# Patient Record
Sex: FEMALE | Race: WHITE | URBAN - METROPOLITAN AREA
[De-identification: names, ages, dates, MRNs, and addresses within clinical notes are randomized per-mention and may not be internally consistent; named-entity substitution may affect disease eponyms.]

---

## 2018-09-25 LAB
ABO, EXTERNAL RESULT: NORMAL
HEP B, EXTERNAL RESULT: NONREACTIVE
HIV, EXTERNAL RESULT: NEGATIVE
RHOGAM, EXTERNAL RESULT: POSITIVE
RPR, EXTERNAL RESULT: NEGATIVE
RUBELLA TITER, EXTERNAL RESULT: NORMAL

## 2018-10-02 LAB
C. TRACHOMATIS, EXTERNAL RESULT: NEGATIVE
N. GONORRHOEAE, EXTERNAL RESULT: NEGATIVE

## 2019-04-04 LAB — GBS, EXTERNAL RESULT: NEGATIVE

## 2019-05-12 ENCOUNTER — HOSPITAL ENCOUNTER (OUTPATIENT)
Age: 29
Discharge: HOME | End: 2019-05-12
Attending: OBSTETRICS & GYNECOLOGY | Admitting: OBSTETRICS & GYNECOLOGY
Payer: COMMERCIAL

## 2019-05-12 VITALS
BODY MASS INDEX: 27.6 KG/M2 | TEMPERATURE: 98.3 F | WEIGHT: 150 LBS | HEART RATE: 81 BPM | DIASTOLIC BLOOD PRESSURE: 73 MMHG | HEIGHT: 62 IN | RESPIRATION RATE: 16 BRPM | SYSTOLIC BLOOD PRESSURE: 121 MMHG

## 2019-05-12 PROBLEM — O34.211 MATERNAL CARE DUE TO LOW TRANSVERSE UTERINE SCAR FROM PREVIOUS CESAREAN DELIVERY: Status: ACTIVE | Noted: 2019-05-12

## 2019-05-12 PROBLEM — O09.93: Status: ACTIVE | Noted: 2019-05-12

## 2019-05-12 PROBLEM — Z3A.41 POST TERM PREGNANCY AT 41 WEEKS GESTATION: Status: ACTIVE | Noted: 2019-05-12

## 2019-05-12 PROBLEM — O48.0 POST TERM PREGNANCY AT 41 WEEKS GESTATION: Status: ACTIVE | Noted: 2019-05-12

## 2019-05-12 PROCEDURE — 59025 FETAL NON-STRESS TEST: CPT

## 2019-05-12 SDOH — HEALTH STABILITY: MENTAL HEALTH: HOW OFTEN DO YOU HAVE A DRINK CONTAINING ALCOHOL?: NEVER

## 2019-05-12 NOTE — DISCHARGE INSTR - COC
Continuity of Care Form    Patient Name: Moshe Holter   :  1990  MRN:  [id number]    Admit date:  2019  Discharge date:  ***    Code Status Order: No Order   Advance Directives:   Advance Care Flowsheet Documentation     Date/Time Healthcare Directive Type of Healthcare Directive Copy in [address] Agent's Name Healthcare Agent's Phone Number    19 0911  No, patient does not have an advance directive for healthcare treatment -- -- -- -- --          Admitting Physician:  Omid Soria DO  PCP: No primary care provider on file. Discharging Nurse: [facility] Unit/Room#: TR01/TR01-01  Discharging Unit Phone Number: ***    Emergency Contact:   Extended Emergency Contact Information  Primary Emergency Contact: adela huntley  Address: [address] [address], [address] [place] [address] Phone: [phone]  Mobile Phone: [phone]  Relation: Spouse    Past Surgical History:  History reviewed. No pertinent surgical history.     Immunization History:   Immunization History   Administered Date(s) Administered    Influenza Virus Vaccine 09/15/2018    Tdap (Boostrix, Adacel) 2019       Active Problems:  Patient Active Problem List   Diagnosis Code    Post term pregnancy at 39 weeks gestation O48.0, Z3A.41    Maternal care due to low transverse uterine scar from previous  delivery O34.211    Obstetric risk in pregnant patient in third trimester, antepartum O09.93       Isolation/Infection:   Isolation          No Isolation            Nurse Assessment:  Last Vital Signs: /73   Pulse 81   Temp 98.3 °F (36.8 °C) (Oral)   Resp 16   Ht 5' 1.5\" (1.562 m)   Wt 150 lb (68 kg)   LMP 2018   BMI 27.88 kg/m²     Last documented pain score (0-10 scale):    Last Weight:   Wt Readings from Last 1 Encounters:   19 150 lb (68 kg)     Mental Status:  {IP PT MENTAL STATUS:78157}    IV Access:  { AMBROSIO IV ACCESS:622947861}    Nursing Mobility/ADLs:  Walking   {CHP DME YORQ:939391577}  Transfer  {CHP DME RYZI:372493835}  Bathing  {CHP DME ROWU:399048424}  Dressing  {CHP DME KNLN:931670097}  Toileting  {CHP DME RIRU:360277291}  Feeding  {CHP DME IFCZ:577793283}  Med Admin  {P DME TTMP:605260617}  Med Delivery   { AMBROSIO MED Delivery:366676310}    Wound Care Documentation and Therapy:        Elimination:  Continence:   · Bowel: {YES / PZ:82583}  · Bladder: {YES / IJ:60612}  Urinary Catheter: {Urinary Catheter:390454022}   Colostomy/Ileostomy/Ileal Conduit: {YES / YY:44639}       Date of Last BM: ***  No intake or output data in the 24 hours ending 19 1012  No intake/output data recorded.     Safety Concerns:     [address] Safety Concerns:389104389}    Impairments/Disabilities:      [address] Impairments/Disabilities:281181137}    Nutrition Therapy:  Current Nutrition Therapy:   [address] Diet List:209683604}    Routes of Feeding: {[facility] DME Other Feedings:389717348}  Liquids: {Slp liquid thickness:45741}  Daily Fluid Restriction: {CHP DME Yes amt example:141201475}  Last Modified Barium Swallow with Video (Video Swallowing Test): {Done Not Done [facility]:151153645}    Treatments at the Time of Hospital Discharge:   Respiratory Treatments: ***  Oxygen Therapy:  {Therapy; copd oxygen:00284}  Ventilator:    {[facility] Vent AGVJ:585307328}    Rehab Therapies: {THERAPEUTIC INTERVENTION:4784309102}  Weight Bearing Status/Restrictions: [address] Weight Bearin}  Other Medical Equipment (for information only, NOT a DME order):  {EQUIPMENT:990128771}  Other Treatments: ***    Patient's personal belongings (please select all that are sent with patient):  {[facility] DME Belongings:891627331}    RN SIGNATURE:  {Esignature:760890735}    CASE MANAGEMENT/SOCIAL WORK SECTION    Inpatient Status Date: ***    Readmission Risk Assessment Score:  Readmission Risk              Risk of Unplanned Readmission:        0           Discharging to Facility/ Agency   · Name:   · Address:  · Phone:  · Fax:    Dialysis Facility (if applicable)   · Name:  · Address:  · Dialysis Schedule:  · Phone:  · Fax:    / signature: {Esignature:295072925}    PHYSICIAN SECTION    Prognosis: {Prognosis:7500230522}    Condition at Discharge: Jrery Rowe Patient Condition:309255214}    Rehab Potential (if transferring to Rehab): {Prognosis:4541482026}    Recommended Labs or Other Treatments After Discharge: ***    Physician Certification: I certify the above information and transfer of Jessica Valera  is necessary for the continuing treatment of the diagnosis listed and that she requires {Admit to Appropriate Level of Care:67983} for {GREATER/LESS:405906527} 30 days.      Update Admission H&P: [facility]2724700[facility]}    PHYSICIAN SIGNATURE:  {Esignature:612021699}

## 2019-05-12 NOTE — PROGRESS NOTES
PATIENTS ONCOLOGY RECORD LOCATED IN [facility]      Subjective     Name:  JCARLOS BOWEN     Date:  2018  Address:  [address] [place], [place] [address] [place] [address]  Home: [phone]  :  1954 AGE:  63 y.o.        RECORDS OBTAINED:  Patients Oncology Record is located in [facility]   S. No Complaints of Pain. Contractions not uncomfortable at this time. O.   98.3 (36.8)  16  81  121/73   FHT baseline: 125  Cat I    Decelerations no     Accelerations yes     Variability mod. NST reactive with at least 3 accelerations  Contra: q 7-8 Min on 30 minute strip  Cx exam: 2-3 cm 70% effaced soft 0 sta very posterior, but membranes stripped  DAINA:  5.53 II  3.33          4.01 II  3.81  = 16.68    BPP 10/10  BABY vertex ROT    A. 41 wk IUP w prior C/S going to attempt HB    P.  F/U 3-4 days for NST. Do kick count. Reinforced that she is not a HB candidate since she had a previous C/S.

## 2019-05-12 NOTE — PROGRESS NOTES
Dr. Dilcia Bateman did SVE at this time pt is 2cm, 70% effaced and 0 station. Fetus is vertex.  Membrane sweep done at this time

## 2019-05-12 NOTE — H&P
Department of Obstetrics and Gynecology   Obstetrics History and Physical        CHIEF COMPLAINT:  Here for 41 wk pregnancy and  testing    HISTORY OF PRESENT ILLNESS:  The patient is a 25 y.o. female at 40w1d. Patient presents with a chief complaint as above and is being seen for  testing for post dates preg. Estimated Due Date: Estimated Date of Delivery: 19    PRENATAL CARE:  Complicated by HR preg for for prior C/S now at 41 wks preg    PAST OB HISTORY:  OB History        2    Para   1    Term   1            AB        Living   1       SAB        TAB        Ectopic        Molar        Multiple        Live Births   1              Past Medical History:    History reviewed. No pertinent past medical history. Past Surgical History:    History reviewed. No pertinent surgical history. Allergies:  Patient has no known allergies.     Social History:    Social History     Socioeconomic History    Marital status: Unknown     Spouse name: Not on file    Number of children: Not on file    Years of education: Not on file    Highest education level: Not on file   Occupational History    Not on file   Social Needs    Financial resource strain: Not on file    Food insecurity:     Worry: Not on file     Inability: Not on file    Transportation needs:     Medical: Not on file     Non-medical: Not on file   Tobacco Use    Smoking status: Never Smoker    Smokeless tobacco: Never Used   Substance and Sexual Activity    Alcohol use: Never     Frequency: Never    Drug use: Never    Sexual activity: Yes     Partners: Male   Lifestyle    Physical activity:     Days per week: Not on file     Minutes per session: Not on file    Stress: Not on file   Relationships    Social connections:     Talks on phone: Not on file     Gets together: Not on file     Attends [employer] service: Not on file     Active member of club or organization: Not on file     Attends meetings of clubs or organizations: Not on file     Relationship status: Not on file    Intimate partner violence:     Fear of current or ex partner: Not on file     Emotionally abused: Not on file     Physically abused: Not on file     Forced sexual activity: Not on file   Other Topics Concern    Not on file   Social History Narrative    Not on file     Family History:   History reviewed. No pertinent family history. Medications Prior to Admission:  Medications Prior to Admission: Prenatal MV-Min-Fe Fum-FA-DHA (PRENATAL 1 PO), Take by mouth    REVIEW OF SYSTEMS:  CONSTITUTIONAL:  negative  RESPIRATORY:  negative  CARDIOVASCULAR:  negative  GASTROINTESTINAL:  negative  ALLERGIC/IMMUNOLOGIC:  negative  NEUROLOGICAL:  negative  BEHAVIOR/PSYCH:  negative    PHYSICAL EXAM:  Vitals:    05/12/19 0858 05/12/19 0910   BP: 121/73    Pulse: 81    Resp: 16    Temp: 98.3 °F (36.8 °C)    TempSrc: Oral    Weight:  150 lb (68 kg)   Height:  5' 1.5\" (1.562 m)     General appearance:  awake, alert, cooperative, no apparent distress, and appears stated age  Neurologic:  Awake, alert, oriented to name, place and time. Lungs:  No increased work of breathing, good air exchange  Abdomen:  Soft, non tender, gravid, consistent with her gestational age, EFW by Leopald's manouever was 5 Gm   Fetal heart rate:  Reassuring. Pelvis:  Adequate pelvis  Cervix: 2-3 cm 70% soft 0 very posterior  Contraction frequency:  7 minutes    Membranes:  Intact    Labs: :None done at this visit    ASSESSMENT AND PLAN:  41 wk IUP w prior C/S going to attempt HB. Fetus: Reassuring w reactive NST and BPP 10/10  GBS: No    Reinforced that HB is not recommended on person w prior C/S. Also with 41 wk preg recommend NST repeat at 3-4 days from now. Increased risk stillbirth, macrosomia or insufficient growth  and meconium aspiration with going post dates. Increased risk of uterine rupture with maternal and fetal complications d/t prior C/Section.     42 Pati Quintanilla De Médicis, DO

## 2019-05-12 NOTE — PROGRESS NOTES
RN reviewed discharge instructions with pt. All questions answered. Pt gave verbal and written understanding of discharge instructions. Pt encouraged to call Dr. Pau Kapadia and Dr. John Vo when she goes into labor and keep them informed. Pt states she \"understands the risks and that she has done her own research\". Pt discharged at this time. Pt in stable ambulatory condition.

## 2019-05-12 NOTE — PROGRESS NOTES
Bedside bpp done at this time. Score of 8/8 with reactive tracing. adonay 16.68. Plan of care reviewed with pt  And risks and benefits of home birth  After a  section discussed with pt. Pt states she is still planning on a home birth. Pt states she is feeling the  Baby move and denies leaking of fluid.

## 2019-11-22 NOTE — DISCHARGE INSTR - DIET
